# Patient Record
Sex: MALE | HISPANIC OR LATINO
[De-identification: names, ages, dates, MRNs, and addresses within clinical notes are randomized per-mention and may not be internally consistent; named-entity substitution may affect disease eponyms.]

---

## 2022-12-05 ENCOUNTER — APPOINTMENT (OUTPATIENT)
Dept: ENDOCRINOLOGY | Facility: CLINIC | Age: 42
End: 2022-12-05

## 2022-12-05 VITALS
BODY MASS INDEX: 41.75 KG/M2 | HEIGHT: 73 IN | SYSTOLIC BLOOD PRESSURE: 160 MMHG | DIASTOLIC BLOOD PRESSURE: 100 MMHG | HEART RATE: 100 BPM | WEIGHT: 315 LBS

## 2022-12-05 PROBLEM — Z00.00 ENCOUNTER FOR PREVENTIVE HEALTH EXAMINATION: Status: ACTIVE | Noted: 2022-12-05

## 2022-12-05 PROCEDURE — 99205 OFFICE O/P NEW HI 60 MIN: CPT

## 2022-12-05 RX ORDER — VALSARTAN 160 MG/1
160 TABLET, COATED ORAL
Qty: 30 | Refills: 0 | Status: ACTIVE | COMMUNITY
Start: 2022-11-10

## 2022-12-05 RX ORDER — FUROSEMIDE 20 MG/1
20 TABLET ORAL
Qty: 30 | Refills: 0 | Status: ACTIVE | COMMUNITY
Start: 2022-11-18

## 2022-12-05 NOTE — ASSESSMENT
[Long Term Vascular Complications] : long term vascular complications of diabetes [Carbohydrate Consistent Diet] : carbohydrate consistent diet [Importance of Diet and Exercise] : importance of diet and exercise to improve glycemic control, achieve weight loss and improve cardiovascular health [Self Monitoring of Blood Glucose] : self monitoring of blood glucose [Weight Loss] : weight loss [FreeTextEntry1] : 1) DM2: Uncontrolled, A1C target of <7%. Natural hx of the disease and importance of treatment targets discussed at length, he verbalized understanding. ADA diet and importance of exercise discussed at length. Plan is to increase metformin to full dose and introduce GLP-1 agonist today . Refer to Nutritionist today. We emily check microalbumin, lipids and labs on the NV. Discuss vaccines and podiatry/opthalmology referrals on NV \par \par 2) Weight gain:  complicated by DM2. Discussed medical  strategies. Pt would like to try lifestyle modifications and GLP-1 agonist therapy at this time. Reassess on the NV for at least ~5% TBW loss.\par \par 3) Essential HTN: Pt is not at goal BP and on an Arb. Reassess microalbumin prior to the NV. r/o 2ry causes of HTN\par  \par 4) Dyslipidemia: Pt is on a moderate intensity statin.REassess lipids on the next visit. LDL target <100.\par

## 2023-02-13 ENCOUNTER — APPOINTMENT (OUTPATIENT)
Dept: ENDOCRINOLOGY | Facility: CLINIC | Age: 43
End: 2023-02-13
Payer: COMMERCIAL

## 2023-02-13 VITALS
HEIGHT: 73 IN | SYSTOLIC BLOOD PRESSURE: 160 MMHG | DIASTOLIC BLOOD PRESSURE: 100 MMHG | WEIGHT: 315 LBS | BODY MASS INDEX: 41.75 KG/M2

## 2023-02-13 PROCEDURE — 99215 OFFICE O/P EST HI 40 MIN: CPT

## 2023-02-13 NOTE — HISTORY OF PRESENT ILLNESS
[FreeTextEntry1] : 43 y/o M w/ Hx of DM2, HTN\par here for initial evaluation and management\par generally feels well and endorses no acute complaints.\par reports recent diagnosis of diabetes, + family hx. reports some nocturia. long standing hx of obesity. worsened after he quit smoking 1 y/a. \par \par \par 2/2023 Here for /fu, generally feels well and endorses no acute complaints. No interval events since LV. Today reports good tolerance of moujaro and glycemic response, has electively lost 30 lb. labs w/ PRA wnl, Jarrett pending, TFTs wnl. no evidence of 2ry causes of HTN at this time.\par he otherwise denies any f/c, CP, SOB, palpitations, tremors, depressed mood, anxiety, palpitations, n/v, stool/urinary abn, skin/weight changes, heat/cold intolerance, HAs, breast/nipple changes, polyuria/polydipsia/nocturia or other complaints.\par recent labs w/ TSH at 0.66, A1C at 9.3%, reports adherence to metformin 1 gm QD.

## 2023-02-14 LAB — MICROALBUMIN/CREAT 24H UR-RTO: 14

## 2023-04-10 ENCOUNTER — APPOINTMENT (OUTPATIENT)
Dept: ENDOCRINOLOGY | Facility: CLINIC | Age: 43
End: 2023-04-10
Payer: COMMERCIAL

## 2023-04-10 VITALS
BODY MASS INDEX: 41.75 KG/M2 | DIASTOLIC BLOOD PRESSURE: 90 MMHG | WEIGHT: 315 LBS | HEIGHT: 73 IN | SYSTOLIC BLOOD PRESSURE: 130 MMHG

## 2023-04-10 PROCEDURE — 99215 OFFICE O/P EST HI 40 MIN: CPT

## 2023-04-10 NOTE — ASSESSMENT
[Long Term Vascular Complications] : long term vascular complications of diabetes [Carbohydrate Consistent Diet] : carbohydrate consistent diet [Importance of Diet and Exercise] : importance of diet and exercise to improve glycemic control, achieve weight loss and improve cardiovascular health [Self Monitoring of Blood Glucose] : self monitoring of blood glucose [Weight Loss] : weight loss [FreeTextEntry1] : 1) DM2: Uncontrolled, A1C target of <7%. Natural hx of the disease and importance of treatment targets discussed at length, he verbalized understanding. ADA diet and importance of exercise discussed at length. Plan is to cont metformin full dose and introduce GLP-1 agonist today (mounjaro, has failed metformin max dose therapy) . Refer to Nutritionist today. We emily check microalbumin, lipids and labs on the NV. Discuss vaccines and podiatry/opthalmology referrals on NV \par \par 2) Weight gain:  complicated by DM2. Discussed medical  strategies. Pt would like to try lifestyle modifications and GLP-1 agonist therapy at this time. Reassess on the NV for at least ~5% TBW loss.\par \par 3) Essential HTN: Pt is not at goal BP and on an Arb. Reassess microalbumin prior to the NV. r/o 2ry causes of HTN\par  \par 4) Dyslipidemia: Pt is on a moderate intensity statin.REassess lipids on the next visit. LDL target <100.\par

## 2023-04-10 NOTE — HISTORY OF PRESENT ILLNESS
[FreeTextEntry1] : 43 y/o M w/ Hx of DM2, HTN\par here for initial evaluation and management\par generally feels well and endorses no acute complaints.\par reports recent diagnosis of diabetes, + family hx. reports some nocturia. long standing hx of obesity. worsened after he quit smoking 1 y/a. \par \par \par 4/2023 Here for /fu, generally feels well and endorses no acute complaints. No interval events since LV. Today reports good tolerance of moujaro and glycemic response, had electively lost 30 lb but regained it once he ran out of samples. labs w/ no evidence of 2ry causes of HTN 2/2023\par he otherwise denies any f/c, CP, SOB, palpitations, tremors, depressed mood, anxiety, palpitations, n/v, stool/urinary abn, skin/weight changes, heat/cold intolerance, HAs, breast/nipple changes, polyuria/polydipsia/nocturia or other complaints.\par recent labs w/ TSH at 0.66, A1C at 9.3%, reports adherence to metformin 1 gm QD.

## 2023-04-11 ENCOUNTER — NON-APPOINTMENT (OUTPATIENT)
Age: 43
End: 2023-04-11

## 2023-04-12 LAB — HBA1C MFR BLD HPLC: 9

## 2023-04-18 ENCOUNTER — NON-APPOINTMENT (OUTPATIENT)
Age: 43
End: 2023-04-18

## 2023-06-27 NOTE — HISTORY OF PRESENT ILLNESS
Curtis Wilcox 
[FreeTextEntry1] : 40 y/o M w/ Hx of DM2, HTN\par here for initial evaluation and management\par generally feels well and endorses no acute complaints.\par reports recent diagnosis of diabetes, + family hx. reports some nocturia. long standing hx of obesity. worsened after he quit smoking 1 y/a. he otherwise denies any f/c, CP, SOB, palpitations, tremors, depressed mood, anxiety, palpitations, n/v, stool/urinary abn, skin/weight changes, heat/cold intolerance, HAs, breast/nipple changes, polyuria/polydipsia/nocturia or other complaints.\par recent labs w/ TSH at 0.66, A1C at 9.3%, reports adherence to metformin 1 gm QD.

## 2023-07-10 ENCOUNTER — APPOINTMENT (OUTPATIENT)
Dept: ENDOCRINOLOGY | Facility: CLINIC | Age: 43
End: 2023-07-10
Payer: COMMERCIAL

## 2023-07-10 VITALS
DIASTOLIC BLOOD PRESSURE: 80 MMHG | SYSTOLIC BLOOD PRESSURE: 140 MMHG | WEIGHT: 315 LBS | HEIGHT: 73 IN | BODY MASS INDEX: 41.75 KG/M2

## 2023-07-10 PROCEDURE — 99215 OFFICE O/P EST HI 40 MIN: CPT

## 2023-07-10 NOTE — HISTORY OF PRESENT ILLNESS
[FreeTextEntry1] : 41 y/o M w/ Hx of DM2, HTN\par here for initial evaluation and management\par generally feels well and endorses no acute complaints.\par reports recent diagnosis of diabetes, + family hx. reports some nocturia. long standing hx of obesity. worsened after he quit smoking 1 y/a. \par \par \par 7/2023 Here for /fu, generally feels well and endorses no acute complaints. No interval events since LV. Today reports good tolerance of moujaro and glycemic response, had electively lost 30 lb but regained it once he ran out of samples. labs w/ no evidence of 2ry causes of HTN 2/2023\par he otherwise denies any f/c, CP, SOB, palpitations, tremors, depressed mood, anxiety, palpitations, n/v, stool/urinary abn, skin/weight changes, heat/cold intolerance, HAs, breast/nipple changes, polyuria/polydipsia/nocturia or other complaints.\par recent labs w/ TSH at 0.66, A1C at 9.3%, reports adherence to metformin 1 gm QD.

## 2023-07-13 ENCOUNTER — RX RENEWAL (OUTPATIENT)
Age: 43
End: 2023-07-13

## 2023-10-17 ENCOUNTER — RX RENEWAL (OUTPATIENT)
Age: 43
End: 2023-10-17

## 2023-11-06 ENCOUNTER — APPOINTMENT (OUTPATIENT)
Dept: ENDOCRINOLOGY | Facility: CLINIC | Age: 43
End: 2023-11-06
Payer: COMMERCIAL

## 2023-11-06 VITALS
DIASTOLIC BLOOD PRESSURE: 90 MMHG | BODY MASS INDEX: 41.75 KG/M2 | SYSTOLIC BLOOD PRESSURE: 150 MMHG | HEIGHT: 73 IN | WEIGHT: 315 LBS

## 2023-11-06 DIAGNOSIS — I10 ESSENTIAL (PRIMARY) HYPERTENSION: ICD-10-CM

## 2023-11-06 DIAGNOSIS — E66.01 MORBID (SEVERE) OBESITY DUE TO EXCESS CALORIES: ICD-10-CM

## 2023-11-06 DIAGNOSIS — E11.65 TYPE 2 DIABETES MELLITUS WITH HYPERGLYCEMIA: ICD-10-CM

## 2023-11-06 DIAGNOSIS — R79.89 OTHER SPECIFIED ABNORMAL FINDINGS OF BLOOD CHEMISTRY: ICD-10-CM

## 2023-11-06 DIAGNOSIS — E78.5 HYPERLIPIDEMIA, UNSPECIFIED: ICD-10-CM

## 2023-11-06 PROCEDURE — 99215 OFFICE O/P EST HI 40 MIN: CPT

## 2023-11-06 RX ORDER — VALSARTAN 320 MG/1
320 TABLET, COATED ORAL
Qty: 90 | Refills: 3 | Status: ACTIVE | COMMUNITY
Start: 2022-11-18 | End: 1900-01-01

## 2023-11-06 RX ORDER — METFORMIN HYDROCHLORIDE 1000 MG/1
1000 TABLET, COATED ORAL TWICE DAILY
Qty: 180 | Refills: 2 | Status: ACTIVE | COMMUNITY
Start: 2022-11-18 | End: 1900-01-01

## 2023-11-06 RX ORDER — ERGOCALCIFEROL 1.25 MG/1
1.25 MG CAPSULE, LIQUID FILLED ORAL
Qty: 12 | Refills: 1 | Status: ACTIVE | COMMUNITY
Start: 2022-11-18 | End: 1900-01-01

## 2023-11-06 RX ORDER — ATORVASTATIN CALCIUM 20 MG/1
20 TABLET, FILM COATED ORAL
Qty: 90 | Refills: 3 | Status: ACTIVE | COMMUNITY
Start: 2022-11-18 | End: 1900-01-01

## 2024-03-26 RX ORDER — TIRZEPATIDE 7.5 MG/.5ML
7.5 INJECTION, SOLUTION SUBCUTANEOUS
Qty: 3 | Refills: 1 | Status: ACTIVE | COMMUNITY
Start: 2022-12-05 | End: 1900-01-01

## 2024-07-29 ENCOUNTER — TRANSCRIPTION ENCOUNTER (OUTPATIENT)
Age: 44
End: 2024-07-29

## 2024-07-29 ENCOUNTER — APPOINTMENT (OUTPATIENT)
Dept: ENDOCRINOLOGY | Facility: CLINIC | Age: 44
End: 2024-07-29
Payer: COMMERCIAL

## 2024-07-29 VITALS — BODY MASS INDEX: 48.82 KG/M2 | SYSTOLIC BLOOD PRESSURE: 160 MMHG | DIASTOLIC BLOOD PRESSURE: 110 MMHG | WEIGHT: 315 LBS

## 2024-07-29 DIAGNOSIS — E78.5 HYPERLIPIDEMIA, UNSPECIFIED: ICD-10-CM

## 2024-07-29 DIAGNOSIS — I10 ESSENTIAL (PRIMARY) HYPERTENSION: ICD-10-CM

## 2024-07-29 DIAGNOSIS — E11.65 TYPE 2 DIABETES MELLITUS WITH HYPERGLYCEMIA: ICD-10-CM

## 2024-07-29 DIAGNOSIS — E66.01 MORBID (SEVERE) OBESITY DUE TO EXCESS CALORIES: ICD-10-CM

## 2024-07-29 PROCEDURE — 99215 OFFICE O/P EST HI 40 MIN: CPT

## 2024-07-29 NOTE — ASSESSMENT
[Long Term Vascular Complications] : long term vascular complications of diabetes [Carbohydrate Consistent Diet] : carbohydrate consistent diet [Importance of Diet and Exercise] : importance of diet and exercise to improve glycemic control, achieve weight loss and improve cardiovascular health [Self Monitoring of Blood Glucose] : self monitoring of blood glucose [Weight Loss] : weight loss

## 2024-07-29 NOTE — HISTORY OF PRESENT ILLNESS
[FreeTextEntry1] : 42 y/o M w/ Hx of DM2, HTN here for initial evaluation and management generally feels well and endorses no acute complaints. reports recent diagnosis of diabetes, + family hx. reports some nocturia. long standing hx of obesity. worsened after he quit smoking 1 y/a.    7/2024 Here for /fu, generally feels well and endorses no acute complaints. No interval events since LV. Today reports good tolerance of moujaro and glycemic response, had electively lost 30 lb but regained it once he ran out of samples. labs w/ no evidence of 2ry causes of HTN 2/2023 he otherwise denies any f/c, CP, SOB, palpitations, tremors, depressed mood, anxiety, palpitations, n/v, stool/urinary abn, skin/weight changes, heat/cold intolerance, HAs, breast/nipple changes, polyuria/polydipsia/nocturia or other complaints. recent labs w/ TSH at 0.66, A1C at 9.3%, reports adherence to metformin 1 gm QD.

## 2025-01-21 ENCOUNTER — RX RENEWAL (OUTPATIENT)
Age: 45
End: 2025-01-21

## 2025-01-21 RX ORDER — TIRZEPATIDE 5 MG/.5ML
5 INJECTION, SOLUTION SUBCUTANEOUS
Qty: 4 | Refills: 0 | Status: ACTIVE | COMMUNITY
Start: 2025-01-21 | End: 1900-01-01

## 2025-03-06 ENCOUNTER — TRANSCRIPTION ENCOUNTER (OUTPATIENT)
Age: 45
End: 2025-03-06

## 2025-03-10 ENCOUNTER — APPOINTMENT (OUTPATIENT)
Dept: ENDOCRINOLOGY | Facility: CLINIC | Age: 45
End: 2025-03-10
Payer: COMMERCIAL

## 2025-03-10 VITALS
WEIGHT: 315 LBS | HEIGHT: 73 IN | SYSTOLIC BLOOD PRESSURE: 170 MMHG | DIASTOLIC BLOOD PRESSURE: 60 MMHG | BODY MASS INDEX: 41.75 KG/M2

## 2025-03-10 DIAGNOSIS — I10 ESSENTIAL (PRIMARY) HYPERTENSION: ICD-10-CM

## 2025-03-10 DIAGNOSIS — E66.01 MORBID (SEVERE) OBESITY DUE TO EXCESS CALORIES: ICD-10-CM

## 2025-03-10 DIAGNOSIS — E78.5 HYPERLIPIDEMIA, UNSPECIFIED: ICD-10-CM

## 2025-03-10 DIAGNOSIS — E11.65 TYPE 2 DIABETES MELLITUS WITH HYPERGLYCEMIA: ICD-10-CM

## 2025-03-10 PROCEDURE — 99214 OFFICE O/P EST MOD 30 MIN: CPT
